# Patient Record
Sex: MALE | Race: WHITE | NOT HISPANIC OR LATINO
[De-identification: names, ages, dates, MRNs, and addresses within clinical notes are randomized per-mention and may not be internally consistent; named-entity substitution may affect disease eponyms.]

---

## 2017-12-01 PROBLEM — Z00.00 ENCOUNTER FOR PREVENTIVE HEALTH EXAMINATION: Status: ACTIVE | Noted: 2017-12-01

## 2017-12-06 ENCOUNTER — APPOINTMENT (OUTPATIENT)
Dept: OTOLARYNGOLOGY | Facility: CLINIC | Age: 77
End: 2017-12-06

## 2023-01-22 ENCOUNTER — APPOINTMENT (OUTPATIENT)
Dept: MRI IMAGING | Facility: IMAGING CENTER | Age: 83
End: 2023-01-22
Payer: MEDICARE

## 2023-01-22 ENCOUNTER — OUTPATIENT (OUTPATIENT)
Dept: OUTPATIENT SERVICES | Facility: HOSPITAL | Age: 83
LOS: 1 days | End: 2023-01-22
Payer: MEDICARE

## 2023-01-22 DIAGNOSIS — Z00.8 ENCOUNTER FOR OTHER GENERAL EXAMINATION: ICD-10-CM

## 2023-01-22 PROCEDURE — 76498 UNLISTED MR PROCEDURE: CPT

## 2023-01-22 PROCEDURE — 72197 MRI PELVIS W/O & W/DYE: CPT | Mod: MH

## 2023-01-22 PROCEDURE — 76498P: CUSTOM | Mod: 26,MH

## 2023-01-22 PROCEDURE — A9585: CPT

## 2023-01-22 PROCEDURE — 72197 MRI PELVIS W/O & W/DYE: CPT | Mod: 26,MH

## 2024-09-24 ENCOUNTER — APPOINTMENT (OUTPATIENT)
Dept: UROLOGY | Facility: CLINIC | Age: 84
End: 2024-09-24
Payer: MEDICARE

## 2024-09-24 VITALS
SYSTOLIC BLOOD PRESSURE: 139 MMHG | DIASTOLIC BLOOD PRESSURE: 68 MMHG | HEART RATE: 85 BPM | TEMPERATURE: 98 F | OXYGEN SATURATION: 98 %

## 2024-09-24 DIAGNOSIS — Z80.42 FAMILY HISTORY OF MALIGNANT NEOPLASM OF PROSTATE: ICD-10-CM

## 2024-09-24 DIAGNOSIS — R35.0 BENIGN PROSTATIC HYPERPLASIA WITH LOWER URINARY TRACT SYMPMS: ICD-10-CM

## 2024-09-24 DIAGNOSIS — Z87.891 PERSONAL HISTORY OF NICOTINE DEPENDENCE: ICD-10-CM

## 2024-09-24 DIAGNOSIS — R97.20 ELEVATED PROSTATE, SPECIFIC ANTIGEN [PSA]: ICD-10-CM

## 2024-09-24 DIAGNOSIS — N40.1 BENIGN PROSTATIC HYPERPLASIA WITH LOWER URINARY TRACT SYMPMS: ICD-10-CM

## 2024-09-24 PROCEDURE — 99204 OFFICE O/P NEW MOD 45 MIN: CPT

## 2024-09-25 PROBLEM — N40.1 BENIGN PROSTATIC HYPERPLASIA WITH URINARY FREQUENCY: Status: ACTIVE | Noted: 2024-09-25

## 2024-09-25 PROBLEM — Z87.891 FORMER SMOKER: Status: ACTIVE | Noted: 2024-09-25

## 2024-09-25 PROBLEM — Z80.42 FAMILY HISTORY OF MALIGNANT NEOPLASM OF PROSTATE: Status: ACTIVE | Noted: 2024-09-25

## 2024-09-25 RX ORDER — TAMSULOSIN HYDROCHLORIDE 0.4 MG/1
CAPSULE ORAL
Refills: 0 | Status: ACTIVE | COMMUNITY

## 2024-09-25 RX ORDER — FLUTICASONE PROPIONATE 50 UG/1
50 SPRAY, METERED NASAL
Refills: 0 | Status: ACTIVE | COMMUNITY

## 2024-09-25 RX ORDER — ESOMEPRAZOLE MAGNESIUM 40 MG/1
CAPSULE, DELAYED RELEASE ORAL
Refills: 0 | Status: ACTIVE | COMMUNITY

## 2024-09-25 NOTE — HISTORY OF PRESENT ILLNESS
[FreeTextEntry1] : Dear Dr. Sarah   Thank you so much for the referral to help care for your patient.  Chief Complaint: Elevated PSA Date of first visit: 9/24/2024 Wife present at visit Patient's preferred # is his cell      ARSENIO URIBE  is a 84 year old  man with PMHx BPH, IBS, microscopic hematuria (worked up by Dr CORTÉS) who presents for elevated PSA. His PSA is 12.5ng/ml. MRI on 9/11/24 demonstrates a 1.1cm PIRADS 4 lesion (midline TZa). MRI in 2023 demonstrated two PIRADS 3 lesions (see below). His PSA density is elevated (0.20 ng/ml/cc). He believes he has only had one prior biopsy with Dr Sarah however his progress note states he had two in 2012 (one with atypia). His father had prostate cancer which was found in his 90s when it had metastasized. He passed away from other causes.  He is experiencing minimal LUTS of minimal bother. Flow varies but is mostly very strong. He feels empty. Does have some frequency but is well hydrated. Wakes up 1-3x at night. He had a procedure >10 years ago with Dr Sarah to shrink the prostate, he does not know if TURP vs TUMT etc. He has remained on 0.4mg flomax and happy.  He had a recent MOHS procedure on face. It was + for cancer, he does not recall which kind.  PSA Hx 12.5 8/13/24. PSAD 0.20 ng/ml/cc 10.79 4/8/24 8.81 12/11/23 8.92 9/21/23 8.97 5/16/23 9.29 1/16/23  MRI Hx MRI at [facility] on 9/11/2024. 61 cc prostate with PIRADS 4 lesion measuring 1.1cm, midline TZa.  No LAD No EPE, No Bony Lesions.  The clinical implications were discussed with the patient.  MRI at [facility] on 1/22/2023. 64cc prostate with PIRADS 3 lesion measuring 1.2cm, right mid pzpl, and PIRADS 3 lesion measuring 1.7cm, left base TZa.   No LAD No EPE, No Bony Lesions.  The clinical implications were discussed with the patient.  09/24/2024 IPSS 3  QOL 2 IIEF5 score 5  Prostate cancer screening: the patient and I spoke at length about prostate cancer screening, its risks and its benefits. The patient has 3 (Age, PSA, FH) risk factors for prostate cancer.  He understands that many men with prostate cancer will die with the disease rather than of it. He also understands that PSA in and of itself does not diagnose prostate cancer but only assesses risk to a certain degree. The patient understands that to definitively screen for prostate cancer, a biopsy is required. The patient opted to fusion biopsy.  The patient denies fevers, chills, nausea and or vomiting and no unexplained weight loss.   All pertinent laboratory, films and physician notes were reviewed.  Questionnaire results were discussed with patient.

## 2024-09-25 NOTE — ASSESSMENT
[FreeTextEntry1] : 85yo  male with PSA 12.5ng/ml, MRI with 1.1cm PIRADS 4 lesion midline TZa, elevated PSAD 0.20ng/ml/cc, prior negative biopsy 2012, + FH in father.  -Book for biopsy with Dr Valero -Continue flomax. He is aware of the risk of retention  He understands that many men with prostate cancer will die with the disease rather than of it. He also understands that PSA in and of itself does not diagnose prostate cancer but only assesses risk to a certain degree. The patient understands that to definitively screen for prostate cancer, a biopsy is required and this procedure has risks, including bleeding, infection, and urinary retention. The patient opted to move forward with the biopsy.   The patient is aware to expect hematuria x 2 weeks and upto 4 weeks of hematospermia.  There is a risk of infection albeit much lower than a transrectal approach. Any fever/chills after the biopsy the patient is to contact the office and go to the ER for an immediate evaluation. He has been given paper instructions outlining these items - which includes medications to avoid prior to surgery.   1. CBC, BMP,  UA UCx EKG  CXR 2. Medical Clearance 3. TP biopsy at [facility] 4. follow up 2 weeks after biopsy with Dr Sarah. 5. we will call with the path results once they are resulted.  Sincerely,   Olinda Angel, MS, ANP-C Senior Nurse Practitioner [facility] at [facility] [address] [place], [place] Tel: [phone]

## 2024-09-25 NOTE — HISTORY OF PRESENT ILLNESS
[FreeTextEntry1] : Dear Dr. Sarah   Thank you so much for the referral to help care for your patient.  Chief Complaint: Elevated PSA Date of first visit: 9/24/2024 Wife present at visit Patient's preferred # is his cell      ARSENIO URIBE  is a 84 year old  man with PMHx BPH, IBS, microscopic hematuria (worked up by Dr CORTÉS) who presents for elevated PSA. His PSA is 12.5ng/ml. MRI on 9/11/24 demonstrates a 1.1cm PIRADS 4 lesion (midline TZa). MRI in 2023 demonstrated two PIRADS 3 lesions (see below). His PSA density is elevated (0.20 ng/ml/cc). He believes he has only had one prior biopsy with Dr Sarah however his progress note states he had two in 2012 (one with atypia). His father had prostate cancer which was found in his 90s when it had metastasized. He passed away from other causes.  He is experiencing minimal LUTS of minimal bother. Flow varies but is mostly very strong. He feels empty. Does have some frequency but is well hydrated. Wakes up 1-3x at night. He had a procedure >10 years ago with Dr Sarah to shrink the prostate, he does not know if TURP vs TUMT etc. He has remained on 0.4mg flomax and happy.  He had a recent MOHS procedure on face. It was + for cancer, he does not recall which kind.  PSA Hx 12.5 8/13/24. PSAD 0.20 ng/ml/cc 10.79 4/8/24 8.81 12/11/23 8.92 9/21/23 8.97 5/16/23 9.29 1/16/23  MRI Hx MRI at [facility] on 9/11/2024. 61 cc prostate with PIRADS 4 lesion measuring 1.1cm, midline TZa.  No LAD No EPE, No Bony Lesions.  The clinical implications were discussed with the patient.  MRI at [facility] on 1/22/2023. 64cc prostate with PIRADS 3 lesion measuring 1.2cm, right mid pzpl, and PIRADS 3 lesion measuring 1.7cm, left base TZa.   No LAD No EPE, No Bony Lesions.  The clinical implications were discussed with the patient.  09/24/2024 IPSS 3  QOL 2 IIEF5 score 5  Prostate cancer screening: the patient and I spoke at length about prostate cancer screening, its risks and its benefits. The patient has 3 (Age, PSA, FH) risk factors for prostate cancer.  He understands that many men with prostate cancer will die with the disease rather than of it. He also understands that PSA in and of itself does not diagnose prostate cancer but only assesses risk to a certain degree. The patient understands that to definitively screen for prostate cancer, a biopsy is required. The patient opted to fusion biopsy.  The patient denies fevers, chills, nausea and or vomiting and no unexplained weight loss.   All pertinent laboratory, films and physician notes were reviewed.  Questionnaire results were discussed with patient.

## 2024-09-25 NOTE — HISTORY OF PRESENT ILLNESS
[FreeTextEntry1] : Dear Dr. Sarah   Thank you so much for the referral to help care for your patient.  Chief Complaint: Elevated PSA Date of first visit: 9/24/2024 Wife present at visit Patient's preferred # is his cell      ARSENIO URIBE  is a 84 year old  man with PMHx BPH, IBS, microscopic hematuria (worked up by Dr CORTÉS) who presents for elevated PSA. His PSA is 12.5ng/ml. MRI on 9/11/24 demonstrates a 1.1cm PIRADS 4 lesion (midline TZa). MRI in 2023 demonstrated two PIRADS 3 lesions (see below). His PSA density is elevated (0.20 ng/ml/cc). He believes he has only had one prior biopsy with Dr Sarah however his progress note states he had two in 2012 (one with atypia). His father had prostate cancer which was found in his 90s when it had metastasized. He passed away from other causes.  He is experiencing minimal LUTS of minimal bother. Flow varies but is mostly very strong. He feels empty. Does have some frequency but is well hydrated. Wakes up 1-3x at night. He had a procedure >10 years ago with Dr Sarah to shrink the prostate, he does not know if TURP vs TUMT etc. He has remained on 0.4mg flomax and happy.  He had a recent MOHS procedure on face. It was + for cancer, he does not recall which kind.  PSA Hx 12.5 8/13/24. PSAD 0.20 ng/ml/cc 10.79 4/8/24 8.81 12/11/23 8.92 9/21/23 8.97 5/16/23 9.29 1/16/23  MRI Hx MRI at [facility] on 9/11/2024. 61 cc prostate with PIRADS 4 lesion measuring 1.1cm, midline TZa.  No LAD No EPE, No Bony Lesions.  The clinical implications were discussed with the patient.  MRI at [facility] on 1/22/2023. 64cc prostate with PIRADS 3 lesion measuring 1.2cm, right mid pzpl, and PIRADS 3 lesion measuring 1.7cm, left base TZa.   No LAD No EPE, No Bony Lesions.  The clinical implications were discussed with the patient.  09/24/2024 IPSS 3  QOL 2 IIEF5 score 5  Prostate cancer screening: the patient and I spoke at length about prostate cancer screening, its risks and its benefits. The patient has 3 (Age, PSA, FH) risk factors for prostate cancer.  He understands that many men with prostate cancer will die with the disease rather than of it. He also understands that PSA in and of itself does not diagnose prostate cancer but only assesses risk to a certain degree. The patient understands that to definitively screen for prostate cancer, a biopsy is required. The patient opted to fusion biopsy.  The patient denies fevers, chills, nausea and or vomiting and no unexplained weight loss.   All pertinent laboratory, films and physician notes were reviewed.  Questionnaire results were discussed with patient.

## 2024-09-25 NOTE — ASSESSMENT
[FreeTextEntry1] : 85yo  male with PSA 12.5ng/ml, MRI with 1.1cm PIRADS 4 lesion midline TZa, elevated PSAD 0.20ng/ml/cc, prior negative biopsy 2012, + FH in father.  -Book for biopsy with Dr Valero -Continue flomax. He is aware of the risk of retention  He understands that many men with prostate cancer will die with the disease rather than of it. He also understands that PSA in and of itself does not diagnose prostate cancer but only assesses risk to a certain degree. The patient understands that to definitively screen for prostate cancer, a biopsy is required and this procedure has risks, including bleeding, infection, and urinary retention. The patient opted to move forward with the biopsy.   The patient is aware to expect hematuria x 2 weeks and upto 4 weeks of hematospermia.  There is a risk of infection albeit much lower than a transrectal approach. Any fever/chills after the biopsy the patient is to contact the office and go to the ER for an immediate evaluation. He has been given paper instructions outlining these items - which includes medications to avoid prior to surgery.   1. CBC, BMP,  UA UCx EKG  CXR 2. Medical Clearance 3. TP biopsy at [facility] 4. follow up 2 weeks after biopsy with Dr Sarah. 5. we will call with the path results once they are resulted.  Sincerely,   Olinda Angel, MS, ANP-C Senior Nurse Practitioner [facility] at [facility] [address] [place], [place] Tel: [phone]

## 2024-09-25 NOTE — ASSESSMENT
[FreeTextEntry1] : 83yo  male with PSA 12.5ng/ml, MRI with 1.1cm PIRADS 4 lesion midline TZa, elevated PSAD 0.20ng/ml/cc, prior negative biopsy 2012, + FH in father.  -Book for biopsy with Dr Valero -Continue flomax. He is aware of the risk of retention  He understands that many men with prostate cancer will die with the disease rather than of it. He also understands that PSA in and of itself does not diagnose prostate cancer but only assesses risk to a certain degree. The patient understands that to definitively screen for prostate cancer, a biopsy is required and this procedure has risks, including bleeding, infection, and urinary retention. The patient opted to move forward with the biopsy.   The patient is aware to expect hematuria x 2 weeks and upto 4 weeks of hematospermia.  There is a risk of infection albeit much lower than a transrectal approach. Any fever/chills after the biopsy the patient is to contact the office and go to the ER for an immediate evaluation. He has been given paper instructions outlining these items - which includes medications to avoid prior to surgery.   1. CBC, BMP,  UA UCx EKG  CXR 2. Medical Clearance 3. TP biopsy at [facility] 4. follow up 2 weeks after biopsy with Dr Sarah. 5. we will call with the path results once they are resulted.  Sincerely,   Olinda Angel, MS, ANP-C Senior Nurse Practitioner [facility] at [facility] [address] [place], [place] Tel: [phone]

## 2024-10-08 ENCOUNTER — APPOINTMENT (OUTPATIENT)
Dept: UROLOGY | Facility: CLINIC | Age: 84
End: 2024-10-08
Payer: MEDICARE

## 2024-10-08 ENCOUNTER — OUTPATIENT (OUTPATIENT)
Dept: OUTPATIENT SERVICES | Facility: HOSPITAL | Age: 84
LOS: 1 days | End: 2024-10-08
Payer: MEDICARE

## 2024-10-08 ENCOUNTER — APPOINTMENT (OUTPATIENT)
Dept: RADIOLOGY | Facility: IMAGING CENTER | Age: 84
End: 2024-10-08
Payer: MEDICARE

## 2024-10-08 VITALS — RESPIRATION RATE: 18 BRPM | DIASTOLIC BLOOD PRESSURE: 76 MMHG | HEART RATE: 83 BPM | SYSTOLIC BLOOD PRESSURE: 167 MMHG

## 2024-10-08 DIAGNOSIS — R97.20 ELEVATED PROSTATE, SPECIFIC ANTIGEN [PSA]: ICD-10-CM

## 2024-10-08 DIAGNOSIS — R97.20 ELEVATED PROSTATE SPECIFIC ANTIGEN [PSA]: ICD-10-CM

## 2024-10-08 DIAGNOSIS — N40.1 BENIGN PROSTATIC HYPERPLASIA WITH LOWER URINARY TRACT SYMPMS: ICD-10-CM

## 2024-10-08 DIAGNOSIS — R35.0 BENIGN PROSTATIC HYPERPLASIA WITH LOWER URINARY TRACT SYMPMS: ICD-10-CM

## 2024-10-08 PROCEDURE — 99204 OFFICE O/P NEW MOD 45 MIN: CPT

## 2024-10-08 PROCEDURE — 71046 X-RAY EXAM CHEST 2 VIEWS: CPT | Mod: 26

## 2024-10-11 ENCOUNTER — OUTPATIENT (OUTPATIENT)
Dept: OUTPATIENT SERVICES | Facility: HOSPITAL | Age: 84
LOS: 1 days | End: 2024-10-11
Payer: MEDICARE

## 2024-10-11 DIAGNOSIS — R97.20 ELEVATED PROSTATE SPECIFIC ANTIGEN [PSA]: ICD-10-CM

## 2024-10-23 RX ORDER — HYDROMORPHONE HYDROCHLORIDE 1 MG/ML
0.5 INJECTION, SOLUTION INTRAMUSCULAR; INTRAVENOUS; SUBCUTANEOUS ONCE
Refills: 0 | Status: DISCONTINUED | OUTPATIENT
Start: 2024-10-24 | End: 2024-10-24

## 2024-10-23 RX ORDER — SODIUM CHLORIDE IRRIG SOLUTION 0.9 %
1000 SOLUTION, IRRIGATION IRRIGATION
Refills: 0 | Status: DISCONTINUED | OUTPATIENT
Start: 2024-10-24 | End: 2024-10-24

## 2024-10-23 RX ORDER — OXYCODONE HYDROCHLORIDE 30 MG/1
5 TABLET, FILM COATED, EXTENDED RELEASE ORAL ONCE
Refills: 0 | Status: DISCONTINUED | OUTPATIENT
Start: 2024-10-24 | End: 2024-10-24

## 2024-10-23 RX ORDER — ONDANSETRON HCL/PF 4 MG/2 ML
4 VIAL (ML) INJECTION ONCE
Refills: 0 | Status: DISCONTINUED | OUTPATIENT
Start: 2024-10-24 | End: 2024-10-24

## 2024-10-23 NOTE — ASU PATIENT PROFILE, ADULT - FALL HARM RISK - UNIVERSAL INTERVENTIONS
Bed in lowest position, wheels locked, appropriate side rails in place/Call bell, personal items and telephone in reach/Instruct patient to call for assistance before getting out of bed or chair/Non-slip footwear when patient is out of bed/[place] to call system/Physically safe environment - no spills, clutter or unnecessary equipment/Purposeful Proactive Rounding/Room/bathroom lighting operational, light cord in reach

## 2024-10-23 NOTE — ASU PATIENT PROFILE, ADULT - NS PREOP UNDERSTANDS INFO
no solids after  12mn, water allowed up to 5am, pt advised to follow MD instructions regarding enema. bring ID and insurance card, no valuables or jewelry, dress comfortable, no smoking, alcohol or drug use today, address and call back number given/yes Negative

## 2024-10-23 NOTE — ASU PATIENT PROFILE, ADULT - NSICDXPASTMEDICALHX_GEN_ALL_CORE_FT
PAST MEDICAL HISTORY:  Acid reflux     Enlarged prostate     IBS (irritable bowel syndrome)     Rosacea

## 2024-10-23 NOTE — PRE-ANESTHESIA EVALUATION ADULT - NSANTHPMHFT_GEN_ALL_CORE
84-year-old male presenting for transperineal MR US fusionguided prostate biopsy. History of GERD, IBS and BPH.

## 2024-10-23 NOTE — PRE-ANESTHESIA EVALUATION ADULT - NSANTHOSAYNRD_GEN_A_CORE
No. ROSENDO screening performed.  STOP BANG Legend: 0-2 = LOW Risk; 3-4 = INTERMEDIATE Risk; 5-8 = HIGH Risk

## 2024-10-24 ENCOUNTER — TRANSCRIPTION ENCOUNTER (OUTPATIENT)
Age: 84
End: 2024-10-24

## 2024-10-24 ENCOUNTER — OUTPATIENT (OUTPATIENT)
Dept: OUTPATIENT SERVICES | Facility: HOSPITAL | Age: 84
LOS: 1 days | Discharge: HOME | End: 2024-10-24
Payer: MEDICARE

## 2024-10-24 ENCOUNTER — APPOINTMENT (OUTPATIENT)
Dept: UROLOGY | Facility: AMBULATORY SURGERY CENTER | Age: 84
End: 2024-10-24

## 2024-10-24 ENCOUNTER — RESULT REVIEW (OUTPATIENT)
Age: 84
End: 2024-10-24

## 2024-10-24 VITALS
DIASTOLIC BLOOD PRESSURE: 59 MMHG | RESPIRATION RATE: 16 BRPM | SYSTOLIC BLOOD PRESSURE: 138 MMHG | HEART RATE: 75 BPM | TEMPERATURE: 97 F

## 2024-10-24 VITALS
SYSTOLIC BLOOD PRESSURE: 162 MMHG | WEIGHT: 171.3 LBS | RESPIRATION RATE: 16 BRPM | TEMPERATURE: 98 F | HEART RATE: 80 BPM | HEIGHT: 72 IN | DIASTOLIC BLOOD PRESSURE: 84 MMHG | OXYGEN SATURATION: 97 %

## 2024-10-24 DIAGNOSIS — Z98.890 OTHER SPECIFIED POSTPROCEDURAL STATES: Chronic | ICD-10-CM

## 2024-10-24 PROCEDURE — 55700: CPT

## 2024-10-24 PROCEDURE — 76999F: CUSTOM | Mod: 26

## 2024-10-24 PROCEDURE — G0416: CPT | Mod: 26

## 2024-10-24 RX ORDER — FINASTERIDE 5 MG/1
1 TABLET, FILM COATED ORAL
Refills: 0 | DISCHARGE

## 2024-10-24 RX ORDER — ESOMEPRAZOLE SODIUM 40 MG/5ML
1 INJECTION INTRAVENOUS
Refills: 0 | DISCHARGE

## 2024-10-24 RX ORDER — FLUTICASONE PROPIONATE 50 UG/1
1 SPRAY, METERED NASAL
Refills: 0 | DISCHARGE

## 2024-10-24 RX ORDER — VALSARTAN 320 MG/1
1 TABLET ORAL
Refills: 0 | DISCHARGE

## 2024-10-24 RX ORDER — TAMSULOSIN HCL 0.4 MG
1 CAPSULE ORAL
Refills: 0 | DISCHARGE

## 2024-10-24 NOTE — ASU DISCHARGE PLAN (ADULT/PEDIATRIC) - FINANCIAL ASSISTANCE
[facility] provides services at a reduced cost to those who are determined to be eligible through [facility]’s financial assistance program. Information regarding [facility]’s financial assistance program can be found by going to [email or web address] or by calling [phone].

## 2024-10-24 NOTE — BRIEF OPERATIVE NOTE - NSICDXBRIEFPROCEDURE_GEN_ALL_CORE_FT
PROCEDURES:  Transperineal template-guided mapping biopsy of prostate 24-Oct-2024 09:20:48  Peg Leary

## 2024-10-24 NOTE — ASU DISCHARGE PLAN (ADULT/PEDIATRIC) - ASU DC SPECIAL INSTRUCTIONSFT
PROSTATE BIOPSY Transperineal fusion prostate biopsy    GENERAL: Expect blood in the urine, stool, and ejaculate for up to two (2) months postoperatively. This is normal and to be expected after this procedure. Increase hydration to keep the urine as clear as possible.    SURGICAL WOUND: There are often lumps and bumps that can be felt in the perineum (skin between scrotum and anus) for up to two (2) months or more post operatively. These are of no concern and with time they will soften and disappear.  Any “black and blue” bruising areas will also resolve.  You may apply an ice-pack for 15 minutes out of every hour for the first 24 -36 hours to minimize pain and swelling. You may apply over the counter Bacitracin to the wound several times a day, and keep covered with over the counter gauze as necessary.    PAIN: You may have some intermittent pain for up to six (6) weeks post operatively. Pain does not signify any problem unless associated with fever, chills, or inability to void.  If you experience any fevers or chills please call immediately as this may be signs of an infection. You may take Tylenol (acetaminophen) 650-975mg and/or Motrin (ibuprofen) 400-600mg, both available over the counter, for pain every 6 hours as needed. Do not exceed 4000mg of Tylenol (acetaminophen) daily. You may alternate these medications such that you take one or the other every 3 hours for around the clock pain coverage.     ANTICOAGULATION: If you are taking any blood thinning medications, please discuss with your urologist prior to restarting these medications unless otherwise specified.    BATHING: You may shower with soap and water, and pat dry the needle insertion sites in the perineum. Avoid sitting in water for prolonged periods of time for the next few days.    DIET: You may resume your regular diet and regular medication regimen.    ACTIVITY: No heavy lifting or strenuous exercise until you are evaluated at your post-operative appointment. Otherwise, you may return to your usual level of physical activity.    FOLLOW-UP: If you did not already schedule your post-operative appointment, please call your urologist to schedule and follow-up appointment.    CALL YOUR UROLOGIST IF: You have any bleeding that does not stop, inability to void >8 hours, fever over 100.4 F, chills, persistent nausea/vomiting, changes in your incision concerning for infection, or if your pain is not controlled on your discharge pain medications.

## 2024-10-24 NOTE — ASU DISCHARGE PLAN (ADULT/PEDIATRIC) - NS MD DC FALL RISK RISK
For information on Fall & Injury Prevention, visit: [email or web address] OR  [email or web address] OR  https://www.cdc.gov/steadi/patient.html

## 2024-10-24 NOTE — ASU DISCHARGE PLAN (ADULT/PEDIATRIC) - CARE PROVIDER_API CALL
Abhishek Valero  Urology  [address] [place] [address]  Phone: [phone]  Fax: [phone]  Established Patient  Follow Up Time:

## 2024-10-25 ENCOUNTER — NON-APPOINTMENT (OUTPATIENT)
Age: 84
End: 2024-10-25

## 2024-10-30 LAB — SURGICAL PATHOLOGY STUDY: SIGNIFICANT CHANGE UP

## 2024-10-31 ENCOUNTER — NON-APPOINTMENT (OUTPATIENT)
Age: 84
End: 2024-10-31

## 2024-11-20 PROCEDURE — C8001: CPT

## 2024-11-20 PROCEDURE — 71046 X-RAY EXAM CHEST 2 VIEWS: CPT

## 2025-04-25 ENCOUNTER — APPOINTMENT (OUTPATIENT)
Age: 85
End: 2025-04-25
Payer: MEDICARE

## 2025-04-25 ENCOUNTER — NON-APPOINTMENT (OUTPATIENT)
Age: 85
End: 2025-04-25

## 2025-04-25 PROCEDURE — 92012 INTRM OPH EXAM EST PATIENT: CPT | Mod: 25

## 2025-04-25 PROCEDURE — 67820 REVISE EYELASHES: CPT | Mod: E4

## 2025-07-09 ENCOUNTER — APPOINTMENT (OUTPATIENT)
Age: 85
End: 2025-07-09

## 2025-09-08 ENCOUNTER — APPOINTMENT (OUTPATIENT)
Age: 85
End: 2025-09-08
Payer: MEDICARE

## 2025-09-08 ENCOUNTER — NON-APPOINTMENT (OUTPATIENT)
Age: 85
End: 2025-09-08

## 2025-09-08 PROCEDURE — 92012 INTRM OPH EXAM EST PATIENT: CPT

## (undated) DEVICE — BALLOON ENDOCAVITY 2X14CM

## (undated) DEVICE — GRID BRACHYTHERAPY EZ 18G

## (undated) DEVICE — DRAPE TOWEL BLUE 17" X 24"

## (undated) DEVICE — SYR CATHETER TIP 50ML

## (undated) DEVICE — NDL MAX CORE 18G X 25CM

## (undated) DEVICE — SYR LUER LOK 50CC

## (undated) DEVICE — GLV 8 PROTEXIS (WHITE)

## (undated) DEVICE — DRSG TELFA 3 X 8

## (undated) DEVICE — SYR LUER LOK 10CC

## (undated) DEVICE — NDL BIOPSY CHIBA 22G X 20CM

## (undated) DEVICE — PLASTIC SOLUTION BOWL 160Z

## (undated) DEVICE — NDL HYPO REGULAR BEVEL 25G X 1.5" (BLUE)

## (undated) DEVICE — DRAPE MEDIUM SHEET 44" X 70"

## (undated) DEVICE — PREP CHLORAPREP HI-LITE ORANGE 26ML